# Patient Record
Sex: MALE | Race: WHITE | Employment: FULL TIME | URBAN - METROPOLITAN AREA
[De-identification: names, ages, dates, MRNs, and addresses within clinical notes are randomized per-mention and may not be internally consistent; named-entity substitution may affect disease eponyms.]

---

## 2020-12-23 ENCOUNTER — HOSPITAL ENCOUNTER (EMERGENCY)
Facility: CLINIC | Age: 40
Discharge: HOME | End: 2020-12-23
Attending: EMERGENCY MEDICINE | Admitting: EMERGENCY MEDICINE
Payer: OTHER MISCELLANEOUS

## 2020-12-23 ENCOUNTER — APPOINTMENT (OUTPATIENT)
Dept: GENERAL RADIOLOGY | Facility: CLINIC | Age: 40
End: 2020-12-23
Attending: EMERGENCY MEDICINE
Payer: OTHER MISCELLANEOUS

## 2020-12-23 VITALS
BODY MASS INDEX: 29.8 KG/M2 | OXYGEN SATURATION: 97 % | RESPIRATION RATE: 20 BRPM | TEMPERATURE: 98.1 F | SYSTOLIC BLOOD PRESSURE: 149 MMHG | HEART RATE: 91 BPM | HEIGHT: 72 IN | DIASTOLIC BLOOD PRESSURE: 89 MMHG | WEIGHT: 220 LBS

## 2020-12-23 DIAGNOSIS — S82.832A CLOSED AVULSION FRACTURE OF DISTAL END OF LEFT FIBULA, INITIAL ENCOUNTER: ICD-10-CM

## 2020-12-23 PROCEDURE — 99284 EMERGENCY DEPT VISIT MOD MDM: CPT | Mod: 25

## 2020-12-23 PROCEDURE — 250N000013 HC RX MED GY IP 250 OP 250 PS 637: Performed by: EMERGENCY MEDICINE

## 2020-12-23 PROCEDURE — 73610 X-RAY EXAM OF ANKLE: CPT | Mod: LT

## 2020-12-23 PROCEDURE — 27786 TREATMENT OF ANKLE FRACTURE: CPT | Mod: LT

## 2020-12-23 RX ORDER — OXYCODONE HYDROCHLORIDE 5 MG/1
5 TABLET ORAL ONCE
Status: COMPLETED | OUTPATIENT
Start: 2020-12-23 | End: 2020-12-23

## 2020-12-23 RX ORDER — ACETAMINOPHEN 325 MG/1
975 TABLET ORAL ONCE
Status: COMPLETED | OUTPATIENT
Start: 2020-12-23 | End: 2020-12-23

## 2020-12-23 RX ORDER — TRAMADOL HYDROCHLORIDE 50 MG/1
50 TABLET ORAL EVERY 6 HOURS PRN
Qty: 10 TABLET | Refills: 0 | Status: SHIPPED | OUTPATIENT
Start: 2020-12-23 | End: 2020-12-26

## 2020-12-23 RX ORDER — ONDANSETRON 2 MG/ML
4 INJECTION INTRAMUSCULAR; INTRAVENOUS ONCE
Status: DISCONTINUED | OUTPATIENT
Start: 2020-12-23 | End: 2020-12-23

## 2020-12-23 RX ADMIN — ACETAMINOPHEN 975 MG: 325 TABLET, FILM COATED ORAL at 09:49

## 2020-12-23 RX ADMIN — OXYCODONE HYDROCHLORIDE 5 MG: 5 TABLET ORAL at 09:49

## 2020-12-23 ASSESSMENT — MIFFLIN-ST. JEOR: SCORE: 1940.91

## 2020-12-23 NOTE — ED PROVIDER NOTES
History     Chief Complaint:    Ankle Pain      HPI   Cody Basilio is a 40 year old male no past medical history presenting for evaluation of left ankle pain after he jumped off of the skid steer approximately 2 feet in the air landing awkwardly on his left foot.  He endorses pain to the outside of his ankle thinks he heard a pop and was not able to bear weight.  He did not hit his head, denies neck pain.  Denies any knee pain or other leg pain.  No numbness or tingling in his foot.    Allergies:  Denies a history of allergies    Medications:    Denies taking medications.    Problem List:    No reported medical history    Past Medical History:    Reports no medical history.    Past Surgical History:      Past Surgical History:   Procedure Laterality Date     HERNIA REPAIR       ORTHOPEDIC SURGERY      right shoulder        Family History:    History reviewed. No pertinent family history.    Social History:  Marital Status:  Single [1]  Social History     Tobacco Use     Smoking status: Current Every Day Smoker     Packs/day: 1.50     Types: Cigarettes     Smokeless tobacco: Never Used   Substance Use Topics     Alcohol use: Yes     Comment: occ     Drug use: Not Currently        Review of Systems  Denies numbness or tingling of his left foot.    Physical Exam   First Vitals:  BP: (!) 149/89  Pulse: 91  Temp: 98.1  F (36.7  C)  Resp: 20  Height: 182.9 cm (6')  Weight: 99.8 kg (220 lb)  SpO2: 97 %      Physical Exam  Constitutional: Alert, attentive, GCS 15  HENT:    Nose: Nose normal.   Eyes: EOM are normal, anicteric, conjugate gaze  CV: Distal extremities warm well pursued.   Chest: Nonlabored breathing on room air.  GI:  non tender. No distension. No guarding or rebound.    MSK: Moderate left lateral malleoli are tenderness and swelling.  Mild medial malleolar tenderness.  No obvious deformity he can range his foot with good neurovascular status distally.  Neurological: Numbness to his left foot.  Skin: Skin  is warm and dry.      Emergency Department Course     Imaging:  XR Ankle Left G/E 3 Views   Preliminary Result   IMPRESSION: Lateral soft tissue swelling. Oblique fracture of the   distal fibula with 1 mm of displacement.        [facility]    -Fracture    Date/Time: 2020 12:32 PM  Performed by: Rizwan Fonseca MD  Authorized by: Rizwan Fonseca MD       INJURY      Injury location:  Ankle    Ankle injury location:  L ankle    Ankle fracture type: lateral malleolus      PRE PROCEDURE ASSESSMENT      Neurological function: normal      Distal perfusion: normal      Range of motion: reduced      ANESTHESIA (see MAR for exact dosages)      Anesthesia method:  None    PROCEDURE DETAILS:     Manipulation performed: no      Immobilization:  Splint    Splint type:  Short leg and ankle stirrup    Supplies used:  Ortho-Glass    POST PROCEDURE ASSESSMENT      Neurological function: normal      Distal perfusion: normal      Range of motion: unchanged      PROCEDURE   Patient Tolerance:  Patient tolerated the procedure well with no immediate complications                 Impression & Plan      Medical Decision Makin-year-old male no significant past medical history presenting for left ankle pain and swelling after he jumped off of a skid  while at work.  On exam he had moderate lateral ankle swelling with diffuse tenderness and only mild medial malleolar tenderness with no evidence of neurovascular compromise.  X-rays shows distal fibular fracture with minimal displacement.  He was placed in a Ortho-Glass splint as above.  Recommended NWB, elevation, ice and Tylenol, ibuprofen and tramadol as needed will as well as follow-up in orthopedics.    Diagnosis:    ICD-10-CM    1. Closed avulsion fracture of distal end of left fibula, initial encounter  S82.832A        Disposition:  discharged to home    Discharge Medications:  Discharge Medication List as of 2020 11:36 AM       START taking these medications    Details   traMADol (ULTRAM) 50 MG tablet Take 1 tablet (50 mg) by mouth every 6 hours as needed for severe pain, Disp-10 tablet, R-0, Local Print             Rizwan Fonseca MD  12/23/2020   [facility] EMERGENCY DEPT       Raymundo, Rizwan Haque MD  12/23/20 0331

## 2020-12-23 NOTE — ED AVS SNAPSHOT
[facility] Emergency Dept  [address] [address]  Phone: [phone]  Fax: [phone]                                    Cody Basilio   MRN: [id number]    Department: [facility] Emergency Dept   Date of Visit: 12/23/2020           After Visit Summary Signature Page    I have received my discharge instructions, and my questions have been answered. I have discussed any challenges I see with this plan with the nurse or doctor.    ..........................................................................................................................................  Patient/Patient Representative Signature      ..........................................................................................................................................  Patient Representative Print Name and Relationship to Patient    ..................................................               ................................................  Date                                   Time    ..........................................................................................................................................  Reviewed by Signature/Title    ...................................................              ..............................................  Date                                               Time          22EPIC Rev 08/18

## 2020-12-23 NOTE — DISCHARGE INSTRUCTIONS
I recommend you elevate your leg to help with the swelling, you can put ice on over your splint but it cannot get wet.  She also take Tylenol, ibuprofen as needed for pain and you can use the tramadol as needed for more severe pain.  You should not put weight down on your foot and use the crutches as instructed until you follow-up with orthopedics, I recommend calling them first thing today to help schedule the appointment around the holidays.  However, there is no urgency to follow-up.